# Patient Record
Sex: MALE | Race: ASIAN | URBAN - METROPOLITAN AREA
[De-identification: names, ages, dates, MRNs, and addresses within clinical notes are randomized per-mention and may not be internally consistent; named-entity substitution may affect disease eponyms.]

---

## 2019-02-07 ENCOUNTER — APPOINTMENT (RX ONLY)
Dept: URBAN - METROPOLITAN AREA CLINIC 61 | Facility: CLINIC | Age: 44
Setting detail: DERMATOLOGY
End: 2019-02-07

## 2019-02-07 DIAGNOSIS — D86.3 SARCOIDOSIS OF SKIN: ICD-10-CM | Status: INADEQUATELY CONTROLLED

## 2019-02-07 PROCEDURE — ? COUNSELING

## 2019-02-07 PROCEDURE — ? INVENTORY

## 2019-02-07 PROCEDURE — ? PRESCRIPTION

## 2019-02-07 PROCEDURE — 99213 OFFICE O/P EST LOW 20 MIN: CPT

## 2019-02-07 RX ORDER — PREDNISONE 10 MG/1
TABLET ORAL
Qty: 1 | Refills: 0 | Status: ERX | COMMUNITY
Start: 2019-02-07

## 2019-02-07 RX ORDER — CLOBETASOL PROPIONATE 0.5 MG/G
CREAM TOPICAL BID
Qty: 1 | Refills: 3 | Status: ERX | COMMUNITY
Start: 2019-02-07

## 2019-02-07 RX ADMIN — PREDNISONE: 10 TABLET ORAL at 00:00

## 2019-02-07 RX ADMIN — CLOBETASOL PROPIONATE: 0.5 CREAM TOPICAL at 00:00

## 2019-02-07 ASSESSMENT — LOCATION ZONE DERM: LOCATION ZONE: FACE

## 2019-02-07 ASSESSMENT — LOCATION SIMPLE DESCRIPTION DERM: LOCATION SIMPLE: INFERIOR FOREHEAD

## 2019-02-07 ASSESSMENT — LOCATION DETAILED DESCRIPTION DERM: LOCATION DETAILED: INFERIOR MID FOREHEAD

## 2019-05-07 ENCOUNTER — APPOINTMENT (RX ONLY)
Dept: URBAN - METROPOLITAN AREA CLINIC 61 | Facility: CLINIC | Age: 44
Setting detail: DERMATOLOGY
End: 2019-05-07

## 2019-05-07 DIAGNOSIS — D86.3 SARCOIDOSIS OF SKIN: ICD-10-CM

## 2019-05-07 PROCEDURE — ? COUNSELING

## 2019-05-07 PROCEDURE — 99213 OFFICE O/P EST LOW 20 MIN: CPT

## 2019-05-07 PROCEDURE — ? ORDER TESTS

## 2019-05-07 PROCEDURE — ? PRESCRIPTION

## 2019-05-07 RX ORDER — PREDNISONE 10 MG/1
TABLET ORAL
Qty: 1 | Refills: 0 | Status: ERX

## 2019-05-07 ASSESSMENT — LOCATION SIMPLE DESCRIPTION DERM: LOCATION SIMPLE: LEFT FOREHEAD

## 2019-05-07 ASSESSMENT — LOCATION DETAILED DESCRIPTION DERM: LOCATION DETAILED: LEFT SUPERIOR FOREHEAD

## 2019-05-07 ASSESSMENT — LOCATION ZONE DERM: LOCATION ZONE: FACE

## 2019-05-07 NOTE — PROCEDURE: COUNSELING
Detail Level: Detailed
Patient Specific Counseling (Will Not Stick From Patient To Patient): Will order labs in anticipation of starting patient on plaquenil if labs WNL\\nPatient advised to have baseline opthalmology examination when on plaquenil.

## 2019-05-14 ENCOUNTER — RX ONLY (OUTPATIENT)
Age: 44
Setting detail: RX ONLY
End: 2019-05-14

## 2019-05-14 RX ORDER — HYDROXYCHLOROQUINE SULFATE 200 MG/1
TABLET ORAL
Qty: 30 | Refills: 2 | Status: ERX | COMMUNITY
Start: 2019-05-14

## 2020-09-16 ENCOUNTER — APPOINTMENT (RX ONLY)
Dept: URBAN - METROPOLITAN AREA CLINIC 61 | Facility: CLINIC | Age: 45
Setting detail: DERMATOLOGY
End: 2020-09-16

## 2020-09-16 DIAGNOSIS — D86.3 SARCOIDOSIS OF SKIN: ICD-10-CM | Status: INADEQUATELY CONTROLLED

## 2020-09-16 PROCEDURE — ? COUNSELING

## 2020-09-16 PROCEDURE — ? FULL BODY SKIN EXAM - DECLINED

## 2020-09-16 PROCEDURE — ? PRESCRIPTION

## 2020-09-16 PROCEDURE — ? ORDER TESTS

## 2020-09-16 PROCEDURE — ? RECORDS REVIEWED

## 2020-09-16 PROCEDURE — 99214 OFFICE O/P EST MOD 30 MIN: CPT

## 2020-09-16 PROCEDURE — ? RECOMMENDATIONS

## 2020-09-16 PROCEDURE — ? ADDITIONAL NOTES

## 2020-09-16 RX ORDER — HYDROXYCHLOROQUINE SULFATE 200 MG/1
TABLET ORAL BID
Qty: 60 | Refills: 3 | Status: ERX | COMMUNITY
Start: 2020-09-16

## 2020-09-16 RX ADMIN — HYDROXYCHLOROQUINE SULFATE: 200 TABLET ORAL at 00:00

## 2020-09-16 ASSESSMENT — LOCATION DETAILED DESCRIPTION DERM
LOCATION DETAILED: LEFT VENTRAL DISTAL FOREARM
LOCATION DETAILED: LEFT ANTERIOR DISTAL UPPER ARM
LOCATION DETAILED: INFERIOR THORACIC SPINE
LOCATION DETAILED: EPIGASTRIC SKIN
LOCATION DETAILED: LEFT DISTAL PRETIBIAL REGION

## 2020-09-16 ASSESSMENT — LOCATION ZONE DERM
LOCATION ZONE: LEG
LOCATION ZONE: TRUNK
LOCATION ZONE: ARM

## 2020-09-16 ASSESSMENT — LOCATION SIMPLE DESCRIPTION DERM
LOCATION SIMPLE: LEFT FOREARM
LOCATION SIMPLE: LEFT UPPER ARM
LOCATION SIMPLE: LEFT PRETIBIAL REGION
LOCATION SIMPLE: UPPER BACK
LOCATION SIMPLE: ABDOMEN

## 2020-09-16 ASSESSMENT — SEVERITY ASSESSMENT: SEVERITY: MILD TO MODERATE

## 2020-09-16 ASSESSMENT — PAIN INTENSITY VAS: HOW INTENSE IS YOUR PAIN 0 BEING NO PAIN, 10 BEING THE MOST SEVERE PAIN POSSIBLE?: NO PAIN

## 2020-09-16 ASSESSMENT — BSA RASH: BSA RASH: 10

## 2020-09-16 NOTE — PROCEDURE: ORDER TESTS
Expected Date Of Service: 09/16/2020
Bill For Surgical Tray: no
Billing Type: Third-Party Bill
Performing Laboratory: [id number]

## 2020-09-16 NOTE — HPI: RASH
What Type Of Note Output Would You Prefer (Optional)?: Standard Output
How Severe Is Your Rash?: moderate
Is This A New Presentation, Or A Follow-Up?: Follow Up Rash
Additional History: Patient was on Plaquenil  for cutaneous sarcoidosis which was diagnosed in 2017.    The eruption cleared so he stopped the Plaquenil      Now has recurrence.  He denies loss of sensation.  He denies respiratory symptoms. He had a CXR wnl in 2019.    Also a CBC and CMP in 2019 that were wnl.      No one he knows or lives with nor relatives has sarcoidosis nor a similar rash.  He comes from [facility]

## 2020-09-16 NOTE — PROCEDURE: RECORDS REVIEWED
Summary Of Office Notes Reviewed: Record do not indicate a thorough evaluation of Sarcoidosis  no rheumatology consult.
Summary Of Other Records Reviewed: Biopsy read as “sarcoidosis”. Without discussion    ACE results not noted
Detail Level: Generalized

## 2020-09-16 NOTE — PROCEDURE: RECOMMENDATIONS
Recommendations (Free Text): Have Dr Sandip Hand re-read the pathology  noting patient from [place] \\nRepeat. CXR. \\nRepeat labs. Add. ACE. U/A and Quantiferon Gold \\nPatient be evaluated by Rheumatology to confirm diagnosis and to r/o systemic involvement at this time
Detail Level: Zone
Recommendation Preamble: The following recommendations were made during the visit:

## 2020-10-16 ENCOUNTER — APPOINTMENT (RX ONLY)
Dept: URBAN - METROPOLITAN AREA CLINIC 61 | Facility: CLINIC | Age: 45
Setting detail: DERMATOLOGY
End: 2020-10-16

## 2020-10-16 DIAGNOSIS — D86.3 SARCOIDOSIS OF SKIN: ICD-10-CM | Status: IMPROVED

## 2020-10-16 PROCEDURE — ? RECOMMENDATIONS

## 2020-10-16 PROCEDURE — ? LAB REPORTS REVIEWED

## 2020-10-16 PROCEDURE — 99213 OFFICE O/P EST LOW 20 MIN: CPT

## 2020-10-16 PROCEDURE — ? FOLLOW UP FOR NEXT VISIT

## 2020-10-16 PROCEDURE — ? COUNSELING

## 2020-10-16 PROCEDURE — ? ORDER TESTS

## 2020-10-16 ASSESSMENT — LOCATION DETAILED DESCRIPTION DERM
LOCATION DETAILED: LEFT ANTERIOR DISTAL UPPER ARM
LOCATION DETAILED: INFERIOR THORACIC SPINE
LOCATION DETAILED: LEFT DISTAL PRETIBIAL REGION
LOCATION DETAILED: LEFT VENTRAL DISTAL FOREARM
LOCATION DETAILED: EPIGASTRIC SKIN

## 2020-10-16 ASSESSMENT — LOCATION SIMPLE DESCRIPTION DERM
LOCATION SIMPLE: LEFT UPPER ARM
LOCATION SIMPLE: UPPER BACK
LOCATION SIMPLE: LEFT PRETIBIAL REGION
LOCATION SIMPLE: ABDOMEN
LOCATION SIMPLE: LEFT FOREARM

## 2020-10-16 ASSESSMENT — LOCATION ZONE DERM
LOCATION ZONE: ARM
LOCATION ZONE: TRUNK
LOCATION ZONE: LEG

## 2020-10-16 ASSESSMENT — SEVERITY ASSESSMENT: SEVERITY: MILD TO MODERATE

## 2020-10-16 NOTE — PROCEDURE: FOLLOW UP FOR NEXT VISIT
Other Results: TB Test
Instructions (Optional): Post assessment by rheumatology and pathology review consider changing from Plaquenil to first line treatments for sarcoidosis
Detail Level: Generalized

## 2020-10-16 NOTE — PROCEDURE: RECOMMENDATIONS
Recommendations (Free Text): Download Cronometer.\\nHave asked  Dr Sandip Marks to  re-read the pathology  noting patient from [place] \\nPatient asked to obtain an evaluation by Rheumatology to confirm diagnosis and to r/o systemic involvement at this time
Detail Level: Zone
Recommendation Preamble: The following recommendations were made during the visit:

## 2020-11-13 ENCOUNTER — APPOINTMENT (RX ONLY)
Dept: URBAN - METROPOLITAN AREA CLINIC 61 | Facility: CLINIC | Age: 45
Setting detail: DERMATOLOGY
End: 2020-11-13

## 2020-11-13 DIAGNOSIS — D86.3 SARCOIDOSIS OF SKIN: ICD-10-CM

## 2020-11-13 PROBLEM — L30.9 DERMATITIS, UNSPECIFIED: Status: ACTIVE | Noted: 2020-11-13

## 2020-11-13 PROCEDURE — ? LAB REPORTS REVIEWED

## 2020-11-13 PROCEDURE — ? BIOPSY BY PUNCH METHOD

## 2020-11-13 PROCEDURE — 99214 OFFICE O/P EST MOD 30 MIN: CPT | Mod: 25

## 2020-11-13 PROCEDURE — 11105 PUNCH BX SKIN EA SEP/ADDL: CPT

## 2020-11-13 PROCEDURE — 11104 PUNCH BX SKIN SINGLE LESION: CPT

## 2020-11-13 PROCEDURE — ? ADDITIONAL NOTES

## 2020-11-13 PROCEDURE — ? PRESCRIPTION

## 2020-11-13 PROCEDURE — ? PRESCRIPTION MEDICATION MANAGEMENT

## 2020-11-13 PROCEDURE — ? FOLLOW UP FOR NEXT VISIT

## 2020-11-13 PROCEDURE — ? FULL BODY SKIN EXAM - DECLINED

## 2020-11-13 PROCEDURE — ? PATHOLOGY DISCUSSION

## 2020-11-13 PROCEDURE — ? RECOMMENDATIONS

## 2020-11-13 PROCEDURE — ? COUNSELING

## 2020-11-13 RX ORDER — HYDROXYCHLOROQUINE SULFATE 200 MG/1
TABLET ORAL BID
Qty: 60 | Refills: 3 | Status: ERX

## 2020-11-13 ASSESSMENT — LOCATION ZONE DERM
LOCATION ZONE: TRUNK
LOCATION ZONE: ARM
LOCATION ZONE: LEG

## 2020-11-13 ASSESSMENT — LOCATION DETAILED DESCRIPTION DERM
LOCATION DETAILED: LEFT DISTAL PRETIBIAL REGION
LOCATION DETAILED: LEFT ANTERIOR DISTAL UPPER ARM
LOCATION DETAILED: INFERIOR THORACIC SPINE
LOCATION DETAILED: LEFT VENTRAL DISTAL FOREARM
LOCATION DETAILED: EPIGASTRIC SKIN

## 2020-11-13 ASSESSMENT — SEVERITY ASSESSMENT: SEVERITY: MILD TO MODERATE

## 2020-11-13 ASSESSMENT — LOCATION SIMPLE DESCRIPTION DERM
LOCATION SIMPLE: LEFT FOREARM
LOCATION SIMPLE: UPPER BACK
LOCATION SIMPLE: LEFT UPPER ARM
LOCATION SIMPLE: ABDOMEN
LOCATION SIMPLE: LEFT PRETIBIAL REGION

## 2020-11-13 ASSESSMENT — BSA RASH: BSA RASH: 25

## 2020-11-13 NOTE — PROCEDURE: BIOPSY BY PUNCH METHOD
Path Notes Override (Will Replace All Of The Above Text): TB test Negative,  patient has intermittent neurological symptoms in toes only

## 2020-11-13 NOTE — PROCEDURE: PRESCRIPTION MEDICATION MANAGEMENT
Plan: At follow up confirm patient has seen ophthalmologist
Render In Strict Bullet Format?: No
Continue Regimen: Plaquenil
Detail Level: Detailed

## 2020-11-13 NOTE — PROCEDURE: FOLLOW UP FOR NEXT VISIT
Instructions (Optional): Post assessment by rheumatology and pathology review consider changing from Plaquenil to first line treatments for sarcoidosis
Detail Level: Generalized
Instructions (Optional): After obtain path results. Discuss with patient seeing psych re. 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
Detail Level: Zone

## 2020-11-13 NOTE — PROCEDURE: ADDITIONAL NOTES
Additional Notes: Patient consent was obtained to proceed with the visit and recommended plan of care after discussion of all risks and benefits, including the risks of COVID-19 exposure.
Detail Level: Simple
Additional Notes: No abnormal clinical finding on skin and neurological exam of toes    Ear lobes not involved
Detail Level: Generalized

## 2020-11-25 ENCOUNTER — APPOINTMENT (RX ONLY)
Dept: URBAN - METROPOLITAN AREA CLINIC 61 | Facility: CLINIC | Age: 45
Setting detail: DERMATOLOGY
End: 2020-11-25

## 2020-11-25 DIAGNOSIS — A30.1 TUBERCULOID LEPROSY: ICD-10-CM | Status: INADEQUATELY CONTROLLED

## 2020-11-25 DIAGNOSIS — Z48.817 ENCOUNTER FOR SURGICAL AFTERCARE FOLLOWING SURGERY ON THE SKIN AND SUBCUTANEOUS TISSUE: ICD-10-CM

## 2020-11-25 PROCEDURE — ? REFERRAL

## 2020-11-25 PROCEDURE — ? ORDER TESTS

## 2020-11-25 PROCEDURE — ? FULL BODY SKIN EXAM - DECLINED

## 2020-11-25 PROCEDURE — 99213 OFFICE O/P EST LOW 20 MIN: CPT

## 2020-11-25 PROCEDURE — ? COUNSELING

## 2020-11-25 PROCEDURE — ? ADDITIONAL NOTES

## 2020-11-25 PROCEDURE — 99024 POSTOP FOLLOW-UP VISIT: CPT

## 2020-11-25 PROCEDURE — ? POST-OP WOUND CHECK

## 2020-11-25 PROCEDURE — ? DEFER

## 2020-11-25 PROCEDURE — ? PATHOLOGY DISCUSSION

## 2020-11-25 PROCEDURE — ? FOLLOW UP FOR NEXT VISIT

## 2020-11-25 ASSESSMENT — LOCATION DETAILED DESCRIPTION DERM
LOCATION DETAILED: STERNUM
LOCATION DETAILED: LEFT ANTERIOR DISTAL UPPER ARM
LOCATION DETAILED: LEFT DISTAL PRETIBIAL REGION
LOCATION DETAILED: RIGHT ANTERIOR PROXIMAL UPPER ARM
LOCATION DETAILED: LEFT MEDIAL UPPER BACK
LOCATION DETAILED: RIGHT PROXIMAL PRETIBIAL REGION

## 2020-11-25 ASSESSMENT — SEVERITY ASSESSMENT: SEVERITY: MODERATE

## 2020-11-25 ASSESSMENT — LOCATION SIMPLE DESCRIPTION DERM
LOCATION SIMPLE: RIGHT PRETIBIAL REGION
LOCATION SIMPLE: CHEST
LOCATION SIMPLE: LEFT UPPER BACK
LOCATION SIMPLE: LEFT PRETIBIAL REGION
LOCATION SIMPLE: LEFT UPPER ARM
LOCATION SIMPLE: RIGHT UPPER ARM

## 2020-11-25 ASSESSMENT — LOCATION ZONE DERM
LOCATION ZONE: LEG
LOCATION ZONE: ARM
LOCATION ZONE: TRUNK

## 2020-11-25 NOTE — PROCEDURE: POST-OP WOUND CHECK
Detail Level: Detailed
Add 59044 Cpt? (Important Note: In 2017 The Use Of 01111 Is Being Tracked By Cms To Determine Future Global Period Reimbursement For Global Periods): yes
Wound Evaluated By: Soledad
Wound Evaluated By: Soledad

## 2020-11-25 NOTE — PROCEDURE: ADDITIONAL NOTES
Additional Notes: Noted that 80% of leprosy globally comes from [facility] including [place] from which the patient and his family immigrated.     Based on medical literature there is a 25 % chance the family members will have also become infected - suggest discuss with ID where Dr Miller wishes to perform the screening or that they should be seen in dermatology for full skin exam and Neuro-exam Additional Notes: Noted that 80% of leprosy globally comes from [facility] including [place] from which the patient and his family immigrated.     Based on medical literature there is a 25 % chance the family members will have also become infected - suggest discuss with ID where Dr Miller wishes to perform the screening or that they should be seen in dermatology for full skin exam and Neuro-exam

## 2020-11-25 NOTE — PROCEDURE: DEFER
Introduction Text (Please End With A Colon): Treatment options include:
Detail Level: Zone
Reason To Defer Override: patient being referred to infectious disease for treatment

## 2021-01-20 ENCOUNTER — APPOINTMENT (RX ONLY)
Dept: URBAN - METROPOLITAN AREA CLINIC 61 | Facility: CLINIC | Age: 46
Setting detail: DERMATOLOGY
End: 2021-01-20

## 2021-01-20 DIAGNOSIS — A30.1 TUBERCULOID LEPROSY: ICD-10-CM | Status: INADEQUATELY CONTROLLED

## 2021-01-20 PROCEDURE — ? FULL BODY SKIN EXAM - DECLINED

## 2021-01-20 PROCEDURE — ? PRESCRIPTION MEDICATION MANAGEMENT

## 2021-01-20 PROCEDURE — ? FOLLOW UP FOR NEXT VISIT

## 2021-01-20 PROCEDURE — ? TREATMENT REGIMEN

## 2021-01-20 PROCEDURE — 99214 OFFICE O/P EST MOD 30 MIN: CPT

## 2021-01-20 PROCEDURE — ? DEFER

## 2021-01-20 PROCEDURE — ? COUNSELING

## 2021-01-20 PROCEDURE — ? PATHOLOGY DISCUSSION

## 2021-01-20 PROCEDURE — ? REFERRAL

## 2021-01-20 PROCEDURE — ? ADDITIONAL NOTES

## 2021-01-20 ASSESSMENT — LOCATION DETAILED DESCRIPTION DERM
LOCATION DETAILED: STERNUM
LOCATION DETAILED: LEFT MEDIAL UPPER BACK
LOCATION DETAILED: LEFT ANTERIOR DISTAL UPPER ARM
LOCATION DETAILED: RIGHT PROXIMAL PRETIBIAL REGION
LOCATION DETAILED: LEFT DISTAL PRETIBIAL REGION
LOCATION DETAILED: RIGHT ANTERIOR PROXIMAL UPPER ARM

## 2021-01-20 ASSESSMENT — LOCATION SIMPLE DESCRIPTION DERM
LOCATION SIMPLE: LEFT PRETIBIAL REGION
LOCATION SIMPLE: RIGHT UPPER ARM
LOCATION SIMPLE: RIGHT PRETIBIAL REGION
LOCATION SIMPLE: LEFT UPPER ARM
LOCATION SIMPLE: LEFT UPPER BACK
LOCATION SIMPLE: CHEST

## 2021-01-20 ASSESSMENT — LOCATION ZONE DERM
LOCATION ZONE: TRUNK
LOCATION ZONE: ARM
LOCATION ZONE: LEG

## 2021-01-20 ASSESSMENT — SEVERITY ASSESSMENT: SEVERITY: MILD TO MODERATE

## 2021-01-20 NOTE — PROCEDURE: TREATMENT REGIMEN
Plan: Patient to see ID to start appropriate medication and to evaluate and manage family members currently ? as symptomatic \\nIf cannot arrange for ID due to COVID 19  will initiate standard of care and manage until ID consult can be achieved.\\nResend Fax to [facility] reporting patient's leprosy.   Also have provided patient with [facility]  contact phone number so he can work with  [facility] as required for himself and his contacts Plan: Patient to see ID to start appropriate medication and to evaluate and manage family members currently ? as symptomatic \\nIf cannot arrange for ID due to COVID 19  will initiate standard of care and manage until ID consult can be achieved.\\nResend Fax to [facility] reporting patient's leprosy.   Also have provided patient with [facility]  contact phone number so he can work with  [facility] as required for himself and his contacts

## 2021-01-20 NOTE — PROCEDURE: DEFER
Instructions (Optional): Rx until patient seen by ID -  Will consider treating without ID consult if none can be arranged
Introduction Text (Please End With A Colon): Treatment options include:
Detail Level: Zone

## 2021-01-20 NOTE — PROCEDURE: FOLLOW UP FOR NEXT VISIT
Instructions (Optional): If patient is not able to get appointment with Dr Miller will consider referring to Karine in [facility]  NB: other ID in [facility] have state they have limited their practices to COVID19 Instructions (Optional): If patient is not able to get appointment with Dr Miller will consider referring to Karine in [facility]  NB: other ID in [facility] have state they have limited their practices to COVID19

## 2021-01-20 NOTE — PROCEDURE: PRESCRIPTION MEDICATION MANAGEMENT
Render In Strict Bullet Format?: No
Plan: Patient to see ID to start appropriate medication and to evaluate and manage family members currently ? as symptomatic \\nIf cannot arrange for ID due to COVID 19  will initiate standard of care and manage until ID consult can be achieved.
Detail Level: Generalized

## 2021-01-20 NOTE — PROCEDURE: ADDITIONAL NOTES
Additional Notes: Noted that 80% of leprosy globally comes from [place] including [place] from which the patient and his family immigrated.     Based on medical literature there is a 25 % chance the family members will have also become infected - suggest discuss with ID where Dr Miller wishes to perform the screening or that they should be seen in dermatology for full skin exam and Neuro-exam\\nPatient states at this point no other family members are symptomatic.   He will call [facility] to confirm that they have all the correct information from the reported leprosy from Nov and again from today Additional Notes: Noted that 80% of leprosy globally comes from [place] including [place] from which the patient and his family immigrated.     Based on medical literature there is a 25 % chance the family members will have also become infected - suggest discuss with ID where Dr Miller wishes to perform the screening or that they should be seen in dermatology for full skin exam and Neuro-exam\\nPatient states at this point no other family members are symptomatic.   He will call [facility] to confirm that they have all the correct information from the reported leprosy from Nov and again from today

## 2021-04-20 ENCOUNTER — APPOINTMENT (RX ONLY)
Dept: URBAN - METROPOLITAN AREA CLINIC 61 | Facility: CLINIC | Age: 46
Setting detail: DERMATOLOGY
End: 2021-04-20

## 2021-04-20 DIAGNOSIS — A30.1 TUBERCULOID LEPROSY: ICD-10-CM | Status: INADEQUATELY CONTROLLED

## 2021-04-20 DIAGNOSIS — L30.9 DERMATITIS, UNSPECIFIED: ICD-10-CM

## 2021-04-20 PROCEDURE — 99213 OFFICE O/P EST LOW 20 MIN: CPT

## 2021-04-20 PROCEDURE — ? PRESCRIPTION

## 2021-04-20 PROCEDURE — ? FULL BODY SKIN EXAM - DECLINED

## 2021-04-20 PROCEDURE — ? COUNSELING

## 2021-04-20 RX ORDER — HYDROCORTISONE 25 MG/G
CREAM TOPICAL BID
Qty: 1 | Refills: 2 | Status: ERX | COMMUNITY
Start: 2021-04-20

## 2021-04-20 RX ORDER — PREDNISONE 20 MG/1
TABLET ORAL AS DIRECTED
Qty: 26 | Refills: 0 | Status: ERX | COMMUNITY
Start: 2021-04-20

## 2021-04-20 RX ADMIN — HYDROCORTISONE: 25 CREAM TOPICAL at 00:00

## 2021-04-20 RX ADMIN — PREDNISONE: 20 TABLET ORAL at 00:00

## 2021-04-20 ASSESSMENT — LOCATION SIMPLE DESCRIPTION DERM
LOCATION SIMPLE: LEFT PRETIBIAL REGION
LOCATION SIMPLE: RIGHT PRETIBIAL REGION
LOCATION SIMPLE: LEFT CHEEK
LOCATION SIMPLE: CHEST
LOCATION SIMPLE: RIGHT CHEEK
LOCATION SIMPLE: LEFT UPPER BACK
LOCATION SIMPLE: LEFT UPPER ARM
LOCATION SIMPLE: RIGHT UPPER ARM

## 2021-04-20 ASSESSMENT — LOCATION ZONE DERM
LOCATION ZONE: TRUNK
LOCATION ZONE: FACE
LOCATION ZONE: LEG
LOCATION ZONE: FACE
LOCATION ZONE: ARM

## 2021-04-20 ASSESSMENT — LOCATION DETAILED DESCRIPTION DERM
LOCATION DETAILED: LEFT DISTAL PRETIBIAL REGION
LOCATION DETAILED: LEFT MEDIAL UPPER BACK
LOCATION DETAILED: RIGHT PROXIMAL PRETIBIAL REGION
LOCATION DETAILED: RIGHT ANTERIOR PROXIMAL UPPER ARM
LOCATION DETAILED: LEFT CENTRAL MALAR CHEEK
LOCATION DETAILED: LEFT ANTERIOR DISTAL UPPER ARM
LOCATION DETAILED: STERNUM
LOCATION DETAILED: RIGHT MEDIAL MALAR CHEEK

## 2021-04-20 NOTE — HPI: HIVES (URTICARIA)
How Severe Are Your Hives?: moderate
Please Select The Phrase That Best Describes Your Hives.: individual welts stay in the same place for more than 24 hours
Is This A New Presentation, Or A Follow-Up?: Hives
What Type Of Medication? (E.G. Antibiotic / Ibuprofen / Aspirin / Painkiller / Etc..): Rifampin, moxifloxacin, and minocycline

## 2021-05-19 ENCOUNTER — APPOINTMENT (RX ONLY)
Dept: URBAN - METROPOLITAN AREA CLINIC 61 | Facility: CLINIC | Age: 46
Setting detail: DERMATOLOGY
End: 2021-05-19

## 2021-05-19 VITALS — TEMPERATURE: 97.4 F

## 2021-05-19 DIAGNOSIS — L30.9 DERMATITIS, UNSPECIFIED: ICD-10-CM | Status: RESOLVED

## 2021-05-19 DIAGNOSIS — A30.1 TUBERCULOID LEPROSY: ICD-10-CM | Status: RESOLVING

## 2021-05-19 PROCEDURE — ? TREATMENT REGIMEN

## 2021-05-19 PROCEDURE — ? FULL BODY SKIN EXAM - DECLINED

## 2021-05-19 PROCEDURE — ? PRESCRIPTION MEDICATION MANAGEMENT

## 2021-05-19 PROCEDURE — ? ADDITIONAL NOTES

## 2021-05-19 PROCEDURE — 99214 OFFICE O/P EST MOD 30 MIN: CPT

## 2021-05-19 PROCEDURE — ? FOLLOW UP FOR NEXT VISIT

## 2021-05-19 PROCEDURE — ? DIAGNOSIS COMMENT

## 2021-05-19 PROCEDURE — ? COUNSELING

## 2021-05-19 ASSESSMENT — LOCATION SIMPLE DESCRIPTION DERM
LOCATION SIMPLE: LEFT UPPER BACK
LOCATION SIMPLE: LEFT UPPER ARM
LOCATION SIMPLE: CHEST
LOCATION SIMPLE: RIGHT PRETIBIAL REGION
LOCATION SIMPLE: LEFT PRETIBIAL REGION
LOCATION SIMPLE: RIGHT CHEEK
LOCATION SIMPLE: RIGHT UPPER ARM
LOCATION SIMPLE: LEFT CHEEK

## 2021-05-19 ASSESSMENT — LOCATION DETAILED DESCRIPTION DERM
LOCATION DETAILED: RIGHT PROXIMAL PRETIBIAL REGION
LOCATION DETAILED: RIGHT ANTERIOR PROXIMAL UPPER ARM
LOCATION DETAILED: LEFT ANTERIOR DISTAL UPPER ARM
LOCATION DETAILED: LEFT MEDIAL UPPER BACK
LOCATION DETAILED: RIGHT MEDIAL MALAR CHEEK
LOCATION DETAILED: LEFT DISTAL PRETIBIAL REGION
LOCATION DETAILED: STERNUM
LOCATION DETAILED: LEFT CENTRAL MALAR CHEEK

## 2021-05-19 ASSESSMENT — LOCATION ZONE DERM
LOCATION ZONE: TRUNK
LOCATION ZONE: LEG
LOCATION ZONE: FACE
LOCATION ZONE: ARM
LOCATION ZONE: FACE

## 2021-05-19 NOTE — PROCEDURE: PRESCRIPTION MEDICATION MANAGEMENT
Render In Strict Bullet Format?: No
Discontinue Regimen: stop prednisone
Detail Level: Zone
Plan: RTC if swelling and /or eruption recurs

## 2021-05-19 NOTE — PROCEDURE: DIAGNOSIS COMMENT
Detail Level: Generalized
Render Risk Assessment In Note?: yes
Comment: eruption may have been allergic reaction to a medication or a Herxheimer type reaction to massive destruction of bacterial antigens

## 2021-05-19 NOTE — PROCEDURE: ADDITIONAL NOTES
Detail Level: Generalized
Render Risk Assessment In Note?: yes
Additional Notes: Patient states that he saw Infectious disease doctor 2 weeks ago, they did bloodwork and he follows up in 1 week with them to review.

## 2021-05-19 NOTE — PROCEDURE: TREATMENT REGIMEN
Detail Level: Detailed
Plan: Confirm that all family members who should be examined and treated have completed the review
Continue Regimen: Rifampin and other Rx as directed by ID

## 2024-01-04 NOTE — PROCEDURE: COUNSELING
Detail Level: Zone
Detail Level: Detailed
Podiatry
Vascular Surgery
Infectious Disease
Detail Level: Generalized